# Patient Record
Sex: MALE | Race: WHITE | NOT HISPANIC OR LATINO | URBAN - METROPOLITAN AREA
[De-identification: names, ages, dates, MRNs, and addresses within clinical notes are randomized per-mention and may not be internally consistent; named-entity substitution may affect disease eponyms.]

---

## 2017-02-23 ENCOUNTER — OFFICE VISIT - RIVER FALLS (OUTPATIENT)
Dept: FAMILY MEDICINE | Facility: CLINIC | Age: 47
End: 2017-02-23

## 2017-04-27 ENCOUNTER — OFFICE VISIT - RIVER FALLS (OUTPATIENT)
Dept: FAMILY MEDICINE | Facility: CLINIC | Age: 47
End: 2017-04-27

## 2017-06-07 ENCOUNTER — OFFICE VISIT - RIVER FALLS (OUTPATIENT)
Dept: FAMILY MEDICINE | Facility: CLINIC | Age: 47
End: 2017-06-07

## 2022-02-12 VITALS
DIASTOLIC BLOOD PRESSURE: 88 MMHG | SYSTOLIC BLOOD PRESSURE: 133 MMHG | WEIGHT: 200.4 LBS | TEMPERATURE: 97.7 F | HEART RATE: 84 BPM

## 2022-02-12 VITALS
TEMPERATURE: 97.9 F | SYSTOLIC BLOOD PRESSURE: 126 MMHG | DIASTOLIC BLOOD PRESSURE: 89 MMHG | WEIGHT: 200.4 LBS | HEART RATE: 92 BPM

## 2022-02-12 VITALS
DIASTOLIC BLOOD PRESSURE: 78 MMHG | HEART RATE: 86 BPM | SYSTOLIC BLOOD PRESSURE: 115 MMHG | TEMPERATURE: 97.8 F | WEIGHT: 195.8 LBS

## 2022-02-16 NOTE — PROGRESS NOTES
Patient:   PAIGE CARRENO            MRN: [id number]            FIN: [id number]               Age:   47 years     Sex:  Male     :  1970   Associated Diagnoses:   Anxiety   Author:   Jared Flowers MD      Visit Information      Date of Service: 2017 08:28 am  Performing Location: [facility]  Encounter#: [id number]      Primary Care Provider (PCP):  Jared Flowers MD    NPI# [id number]      Referring Provider:  No referring provider recorded for selected visit.      Chief Complaint   2017 8:36 AM CST    f/up anxiety, medicaiton check up.        History of Present Illness   The chief complaint and symptoms noted above are confirmed and discussed with the patient. Perez is here in follow up of his anxiety and panic attacks.   xanax only for travel  Needs refills      Review of Systems         All other systems reviewed and negative      Health Status   Allergies:    Allergic Reactions (Selected)  No known allergies   Medications:  (Selected)   Prescriptions  Prescribed  ALPRAZolam 0.5 mg oral tablet: 1 tab(s) ( 0.5 mg ), PO, TID, PRN: for anxiety, # 20 tab(s), 2 Refill(s), Type: Maintenance, called to pharmacy (Rx)  amitriptyline 25 mg oral tablet: 4 tab(s) ( 100 mg ), po, hs, # 60 EA, 0 Refill(s), Type: Maintenance, Pharmacy: [id number], 4 tab(s) po hs  sertraline 50 mg oral tablet: 1 tab(s) ( 50 mg ), po, daily, # 30 tab(s), 11 Refill(s), Type: Maintenance, Pharmacy: [facility] #[id number], 1 tab(s) po daily   Problem list:    All Problems  health maintenance / Confirmed  Anxiety / ICD-9-.00 / Confirmed  Sleep Disorder / ICD-9-.50 / Confirmed  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  Comments:  6/27/2013 11:40 AM - BethAnastacia benson  Back  Repair of rotator cuff of shoulder (SNOMED CT 8246216212).  Comments:  4/21/2010 10:44 AM - Alison Veronica  Right x 3   Social History:        Alcohol Assessment: Current            Current, 1-2 times per week, 4 drinks/episode average.  6 drinks/episode maximum.      Tobacco Assessment: Past            Past, Snuff                     Comments:                      03/08/2011 - Sarah Felton                     1 Tin/Day. Quit in 1997      Substance Abuse Assessment: Denies Substance Abuse      Employment and Education Assessment            Employed, Work/School description: Builds homes.      Exercise and Physical Activity Assessment: Regular exercise            Exercise frequency: 1-2 times/week.  Exercise type: Aerobics, Bicycling, Running, Weight lifting.      Other Assessment            Marital status,         Physical Examination   Vital Signs   2/23/2017 8:36 AM CST Temperature Tympanic 97.9 DegF    Peripheral Pulse Rate 92 bpm    Systolic Blood Pressure 126 mmHg    Diastolic Blood Pressure 89 mmHg    Mean Arterial Pressure 101 mmHg      Measurements from flowsheet : Measurements   2/23/2017 8:36 AM CST    Weight Measured - Standard                200.4 lb     General:  Alert and oriented, No acute distress.    Psychiatric:  Appropriate mood & affect.       Impression and Plan   Diagnosis     Anxiety (YGC64-BN F41.9).     Course:  Progressing as expected.    Plan:  Total time spent with the patient 15 minutes, all of it in counseling. .    Patient Instructions:       Counseled: Patient, Regarding diagnosis, Regarding treatment, Regarding medications.     Orders

## 2022-02-16 NOTE — PROGRESS NOTES
Patient:   PAIGE CARRENO            MRN: [id number]            FIN: [id number]               Age:   47 years     Sex:  Male     :  1970   Associated Diagnoses:   Familial hyperlipidemia; Arteriosclerotic heart disease (ASHD)   Author:   aJred Flowers MD      Visit Information      Date of Service: 2017 08:35 am  Performing Location: [facility]  Encounter#: [id number]      Chief Complaint   2017 8:37 AM CDT    f/up cardiac scan.        Interval History   chief complaint and symptoms as noted above confirmed with patient       Review of Systems   Constitutional:  Negative.    Cardiovascular:  Negative.    Integumentary:  posterior scalp rash 1month.    Neurologic:  Negative.       Health Status   Allergies:    Allergic Reactions (Selected)  No known allergies   Medications:  (Selected)   Prescriptions  Prescribed  ALPRAZolam 0.5 mg oral tablet: 1 tab(s) ( 0.5 mg ), PO, TID, PRN: for anxiety, # 20 tab(s), 2 Refill(s), Type: Maintenance, called to pharmacy (Rx)  amitriptyline 25 mg oral tablet: 4 tab(s) ( 100 mg ), po, hs, # 360 tab(s), 3 Refill(s), Type: Maintenance, Pharmacy: [id number], 4 tab(s) po hs  aspirin 81 mg oral tablet: 1 tab(s) ( 81 mg ), PO, Daily, # 30 tab(s), 0 Refill(s), Type: Maintenance, OTC (Rx)  atorvastatin 40 mg oral tablet: 1 tab(s) ( 40 mg ), PO, Daily, # 90 tab(s), 3 Refill(s), Type: Maintenance, Pharmacy: [facility] #[id number], 1 tab(s) po daily  doxycycline monohydrate 100 mg oral capsule: 1 cap(s) ( 100 mg ), po, bid, # 20 cap(s), 1 Refill(s), Type: Maintenance, Pharmacy: [facility] #[id number], 1 cap(s) po bid,x10 day(s)  sertraline 50 mg oral tablet: 1 tab(s) ( 50 mg ), po, daily, # 90 tab(s), 3 Refill(s), Type: Maintenance, Pharmacy: [facility] #[id number], 1 tab(s) po daily   Problem list:    All Problems  health maintenance / Confirmed  Anxiety / ICD-9-.00 / Confirmed  Sleep Disorder / ICD-9-.50 / Confirmed  Herpes Simplex of  Male Genitalia / ICD-9-.10 / Confirmed  Tobacco abuse / ICD-9-.1 / Confirmed  Family history of heart disease / SNOMED CT 384020963 / Confirmed      Histories   Past Medical History:    Active  health maintenance  Anxiety (300.00)  Sleep Disorder (780.50)  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  Comments:  6/27/2013 11:40 AM - Anastacia Campos  Back  Cardiac computed tomography for calcium scoring (SNOMED CT 6852389970) on 1970.  Comments:  3/9/2017 11:02 AM - Farzaneh Méndez  Total Agatston calcium score is 39  Repair of rotator cuff of shoulder (SNOMED CT 9241759117).  Comments:  4/21/2010 10:44 AM - Veronica Rosas  Right x 3   Social History:        Alcohol Assessment: Current            Current, 1-2 times per week, 4 drinks/episode average.  6 drinks/episode maximum.      Tobacco Assessment: Past            Past, Snuff                     Comments:                      03/08/2011 - Sarah Felton                     1 Tin/Day. Quit in 1997      Substance Abuse Assessment: Denies Substance Abuse      Employment and Education Assessment            Employed, Work/School description: Builds homes.      Exercise and Physical Activity Assessment: Regular exercise            Exercise frequency: 1-2 times/week.  Exercise type: Aerobics, Bicycling, Running, Weight lifting.      Other Assessment            Marital status,         Physical Examination   Vital Signs   4/27/2017 8:38 AM CDT Peripheral Pulse Rate 84 bpm    Systolic Blood Pressure 133 mmHg    Diastolic Blood Pressure 88 mmHg    Mean Arterial Pressure 103 mmHg   4/27/2017 8:37 AM CDT Temperature Tympanic 97.7 DegF  LOW    Peripheral Pulse Rate 85 bpm     Systolic Blood Pressure 142 mmHg  HI    Diastolic Blood Pressure 87 mmHg    Mean Arterial Pressure 105 mmHg      Measurements from flowsheet : Measurements   4/27/2017 8:37 AM CDT    Weight Measured - Standard                200.4 lb     General:  Alert and oriented, No acute distress.    Integumentary:  folliculitis post scalp.       Review / Management   Results review:  Lab results   2/23/2017 9:19 AM CST Glucose Level 92 mg/dL (Modified)    Cholesterol 260 mg/dL  HI (Modified)    Non-  HI (Modified)    HDL 36 mg/dL  LOW (Modified)    Chol/HDL Ratio 7.2  HI (Modified)      HI (Modified)    Triglyceride 164 mg/dL  HI (Modified)   .       Impression and Plan   Diagnosis     Familial hyperlipidemia (QXN09-CH E78.4).     Arteriosclerotic heart disease (ASHD) (CXZ23-UJ I25.10).     Course:  Not progressing as expected.    Plan:  reviewed ct  start lipitor  asa  recheck 1 year  doxy for scalp.    Patient Instructions:       Counseled: Patient, Regarding diagnosis, Regarding treatment, Regarding medications.